# Patient Record
Sex: MALE | Race: WHITE | NOT HISPANIC OR LATINO | URBAN - NONMETROPOLITAN AREA
[De-identification: names, ages, dates, MRNs, and addresses within clinical notes are randomized per-mention and may not be internally consistent; named-entity substitution may affect disease eponyms.]

---
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
## 2021-12-16 ENCOUNTER — IMPORTED ENCOUNTER (OUTPATIENT)
Dept: URBAN - NONMETROPOLITAN AREA CLINIC 1 | Facility: CLINIC | Age: 21
End: 2021-12-16

## 2021-12-16 PROBLEM — Z01.00: Noted: 2021-12-16

## 2021-12-16 PROBLEM — H53.59: Noted: 2020-12-11

## 2021-12-16 PROCEDURE — 92015 DETERMINE REFRACTIVE STATE: CPT

## 2021-12-16 PROCEDURE — 92014 COMPRE OPH EXAM EST PT 1/>: CPT

## 2021-12-16 NOTE — PATIENT DISCUSSION
Clinical Emmetropia OU-  discussed findings w/patient-  no spectacle Rx required at this time-  continue to monitor yearly or prnRed/Green Color DeficiencyPartial red-green color deficiency-  discussed findings w/patient-  RTD 1 year or prn; 's Notes: MR 12/16/2021DFE deferred 12/16/2021

## 2022-04-09 ASSESSMENT — VISUAL ACUITY
OU_SC: J1+
OS_CC: 20/15
OD_CC: 20/15
OD_OTHER: 20/20.
OS_OTHER: 20/20.
OS_CC: 20/20
OU_CC: 20/20
OD_CC: 20/20

## 2022-04-09 ASSESSMENT — TONOMETRY
OD_IOP_MMHG: 16
OD_IOP_MMHG: 15
OD_IOP_MMHG: 15
OS_IOP_MMHG: 15
OS_IOP_MMHG: 14
OS_IOP_MMHG: 15

## 2025-03-18 ENCOUNTER — NEW PATIENT (OUTPATIENT)
Age: 25
End: 2025-03-18

## 2025-03-18 DIAGNOSIS — H52.13: ICD-10-CM

## 2025-03-18 DIAGNOSIS — H53.59: ICD-10-CM

## 2025-03-18 PROCEDURE — 92004 COMPRE OPH EXAM NEW PT 1/>: CPT

## 2025-03-18 PROCEDURE — 92015 DETERMINE REFRACTIVE STATE: CPT
